# Patient Record
Sex: MALE | Race: WHITE | NOT HISPANIC OR LATINO | Employment: PART TIME | URBAN - NONMETROPOLITAN AREA
[De-identification: names, ages, dates, MRNs, and addresses within clinical notes are randomized per-mention and may not be internally consistent; named-entity substitution may affect disease eponyms.]

---

## 2022-02-16 ENCOUNTER — ALLIED HEALTH/NURSE VISIT (OUTPATIENT)
Dept: FAMILY MEDICINE | Facility: OTHER | Age: 32
End: 2022-02-16
Attending: FAMILY MEDICINE
Payer: OTHER GOVERNMENT

## 2022-02-16 DIAGNOSIS — Z20.822 COVID-19 RULED OUT: Primary | ICD-10-CM

## 2022-02-16 PROCEDURE — C9803 HOPD COVID-19 SPEC COLLECT: HCPCS

## 2022-02-16 PROCEDURE — U0005 INFEC AGEN DETEC AMPLI PROBE: HCPCS | Mod: ZL

## 2022-02-17 LAB — SARS-COV-2 RNA RESP QL NAA+PROBE: NEGATIVE

## 2022-02-22 ENCOUNTER — ALLIED HEALTH/NURSE VISIT (OUTPATIENT)
Dept: FAMILY MEDICINE | Facility: OTHER | Age: 32
End: 2022-02-22
Attending: FAMILY MEDICINE
Payer: OTHER GOVERNMENT

## 2022-02-22 DIAGNOSIS — Z20.822 COVID-19 RULED OUT: Primary | ICD-10-CM

## 2022-02-22 PROCEDURE — U0003 INFECTIOUS AGENT DETECTION BY NUCLEIC ACID (DNA OR RNA); SEVERE ACUTE RESPIRATORY SYNDROME CORONAVIRUS 2 (SARS-COV-2) (CORONAVIRUS DISEASE [COVID-19]), AMPLIFIED PROBE TECHNIQUE, MAKING USE OF HIGH THROUGHPUT TECHNOLOGIES AS DESCRIBED BY CMS-2020-01-R: HCPCS | Mod: ZL

## 2022-02-22 PROCEDURE — C9803 HOPD COVID-19 SPEC COLLECT: HCPCS

## 2022-02-23 LAB — SARS-COV-2 RNA RESP QL NAA+PROBE: NEGATIVE

## 2022-03-26 ENCOUNTER — HEALTH MAINTENANCE LETTER (OUTPATIENT)
Age: 32
End: 2022-03-26

## 2022-09-06 ENCOUNTER — OFFICE VISIT (OUTPATIENT)
Dept: FAMILY MEDICINE | Facility: OTHER | Age: 32
End: 2022-09-06
Attending: FAMILY MEDICINE
Payer: COMMERCIAL

## 2022-09-06 ENCOUNTER — ALLIED HEALTH/NURSE VISIT (OUTPATIENT)
Dept: FAMILY MEDICINE | Facility: OTHER | Age: 32
End: 2022-09-06
Payer: COMMERCIAL

## 2022-09-06 VITALS
RESPIRATION RATE: 20 BRPM | OXYGEN SATURATION: 98 % | BODY MASS INDEX: 30.34 KG/M2 | HEART RATE: 72 BPM | TEMPERATURE: 97.4 F | HEIGHT: 68 IN | SYSTOLIC BLOOD PRESSURE: 132 MMHG | DIASTOLIC BLOOD PRESSURE: 80 MMHG | WEIGHT: 200.2 LBS

## 2022-09-06 DIAGNOSIS — Z53.9 ERRONEOUS ENCOUNTER--DISREGARD: Primary | ICD-10-CM

## 2022-09-06 DIAGNOSIS — Z11.1 SCREENING EXAMINATION FOR PULMONARY TUBERCULOSIS: Primary | ICD-10-CM

## 2022-09-06 PROCEDURE — G0463 HOSPITAL OUTPT CLINIC VISIT: HCPCS | Performed by: FAMILY MEDICINE

## 2022-09-06 PROCEDURE — 86481 TB AG RESPONSE T-CELL SUSP: CPT | Mod: ZL | Performed by: FAMILY MEDICINE

## 2022-09-06 PROCEDURE — 99202 OFFICE O/P NEW SF 15 MIN: CPT | Performed by: FAMILY MEDICINE

## 2022-09-06 PROCEDURE — 36415 COLL VENOUS BLD VENIPUNCTURE: CPT | Mod: ZL | Performed by: FAMILY MEDICINE

## 2022-09-06 RX ORDER — LORATADINE 10 MG/1
10 TABLET ORAL DAILY PRN
COMMUNITY
End: 2022-09-06

## 2022-09-06 ASSESSMENT — PAIN SCALES - GENERAL: PAINLEVEL: NO PAIN (0)

## 2022-09-06 NOTE — NURSING NOTE
Patient here for TBGold Test for school. Medication Reconciliation: complete.    Farrah Villagomez LPN  9/6/2022 3:19 PM

## 2022-09-06 NOTE — PROGRESS NOTES
"  Assessment & Plan       ICD-10-CM    1. Screening examination for pulmonary tuberculosis  Z11.1 Quantiferon-TB Gold Plus     Quantiferon-TB Gold Plus     He is in a respiratory therapy training program that requires a TB quantiferon and not the Mantoux.  Additionally, he has had a past indeterminant reading on the Mantoux.  No clear exposures to tuberculosis.  No current symptoms.  Ordered quantiferon with results to [facility] for [facility]    Ace Abbott MD   [facility]     [facility]   Demarcus is a 32 year old, presenting for the following health issues:  Blood Draw (TB Gold test for [facility] [place] )      HPI     As above      Review of Systems   General: Denies general constitutional problems  Cardiovascular: Denies problems  Respiratory: Denies problems       Objective    /80   Pulse 72   Temp 97.4  F (36.3  C)   Resp 20   Ht 1.721 m (5' 7.75\")   Wt 90.8 kg (200 lb 3.2 oz)   SpO2 98%   BMI 30.67 kg/m    Body mass index is 30.67 kg/m .  Physical Exam   General Appearance: Alert. No acute distress  Chest/Respiratory Exam: Clear to auscultation bilaterally  Psychiatric: Normal affect and mentation        "

## 2022-09-08 LAB
GAMMA INTERFERON BACKGROUND BLD IA-ACNC: 0.04 IU/ML
M TB IFN-G BLD-IMP: NEGATIVE
M TB IFN-G CD4+ BCKGRND COR BLD-ACNC: 9.96 IU/ML
MITOGEN IGNF BCKGRD COR BLD-ACNC: -0.01 IU/ML
MITOGEN IGNF BCKGRD COR BLD-ACNC: -0.01 IU/ML
QUANTIFERON MITOGEN: 10 IU/ML
QUANTIFERON NIL TUBE: 0.04 IU/ML
QUANTIFERON TB1 TUBE: 0.03 IU/ML
QUANTIFERON TB2 TUBE: 0.03

## 2022-09-17 ENCOUNTER — HEALTH MAINTENANCE LETTER (OUTPATIENT)
Age: 32
End: 2022-09-17

## 2023-11-30 ENCOUNTER — HOSPITAL ENCOUNTER (EMERGENCY)
Facility: OTHER | Age: 33
Discharge: HOME | End: 2023-11-30
Attending: EMERGENCY MEDICINE | Admitting: EMERGENCY MEDICINE
Payer: COMMERCIAL

## 2023-11-30 VITALS
HEART RATE: 133 BPM | WEIGHT: 190 LBS | RESPIRATION RATE: 16 BRPM | DIASTOLIC BLOOD PRESSURE: 103 MMHG | BODY MASS INDEX: 28.79 KG/M2 | SYSTOLIC BLOOD PRESSURE: 162 MMHG | HEIGHT: 68 IN | TEMPERATURE: 98.2 F | OXYGEN SATURATION: 99 %

## 2023-11-30 DIAGNOSIS — F10.920 ALCOHOLIC INTOXICATION WITHOUT COMPLICATION (H): ICD-10-CM

## 2023-11-30 PROCEDURE — 99282 EMERGENCY DEPT VISIT SF MDM: CPT | Performed by: EMERGENCY MEDICINE

## 2023-11-30 PROCEDURE — 99282 EMERGENCY DEPT VISIT SF MDM: CPT

## 2023-11-30 ASSESSMENT — ENCOUNTER SYMPTOMS
VOMITING: 0
CHEST TIGHTNESS: 0
LIGHT-HEADEDNESS: 0
ARTHRALGIAS: 0
DYSURIA: 0
NAUSEA: 0
FEVER: 0
CHILLS: 0
SHORTNESS OF BREATH: 0
AGITATION: 1

## 2023-11-30 NOTE — ED PROVIDER NOTES
History     Chief Complaint   Patient presents with    Mental Health Eval     ZULEYMA  Demarcus Schilling is a 33 year old male who is brought in by law enforcement.  Apparently the patient had called 911.  He said that he was just having a bad night when I asked him how he was doing.  He would not really get any more specific than that.  Please said that he just was not making sense.  They did not report that he made any suicidal statements.  He is denying suicidality at this time.  Since he has been here and none of the staff have heard him say anything suggesting he is suicidal.  Patient's mother arrives here in the emergency department and is comfortable taking him home.  She does not see anything unusual has not had any indications recently to make her concern for his safety either.    Allergies:  Allergies   Allergen Reactions    Seasonal Allergies Other (See Comments)       Problem List:    Patient Active Problem List    Diagnosis Date Noted    Anxiety state 04/19/2013     Priority: Medium    Depression 04/19/2013     Priority: Medium        Past Medical History:    History reviewed. No pertinent past medical history.    Past Surgical History:    History reviewed. No pertinent surgical history.    Family History:    History reviewed. No pertinent family history.    Social History:  Marital Status:  Single [1]  Social History     Tobacco Use    Smoking status: Never    Smokeless tobacco: Current   Vaping Use    Vaping Use: Never used   Substance Use Topics    Alcohol use: Yes     Comment: 2 a week    Drug use: Never        Medications:    No current outpatient medications on file.        Review of Systems   Constitutional:  Negative for chills and fever.   HENT:  Negative for congestion.    Eyes:  Negative for visual disturbance.   Respiratory:  Negative for chest tightness and shortness of breath.    Cardiovascular:  Negative for chest pain.   Gastrointestinal:  Negative for nausea and vomiting.   Genitourinary:   "Negative for dysuria.   Musculoskeletal:  Negative for arthralgias.   Skin:  Negative for rash.   Neurological:  Negative for light-headedness.   Psychiatric/Behavioral:  Positive for agitation. Negative for suicidal ideas.        Physical Exam   BP: (!) 162/103  Pulse: (!) 133  Temp: 98.2  F (36.8  C)  Resp: 16  Height: 172.7 cm (5' 8\")  Weight: 86.2 kg (190 lb)  SpO2: 99 %      Physical Exam  Vitals and nursing note reviewed.   Constitutional:       Appearance: Normal appearance.   HENT:      Head: Normocephalic and atraumatic.      Mouth/Throat:      Mouth: Mucous membranes are moist.   Eyes:      Conjunctiva/sclera: Conjunctivae normal.   Cardiovascular:      Rate and Rhythm: Normal rate.   Pulmonary:      Effort: Pulmonary effort is normal.   Skin:     General: Skin is warm and dry.   Neurological:      General: No focal deficit present.      Mental Status: He is alert.   Psychiatric:      Comments: He is obviously somewhat intoxicated, however he is steady on his feet and able to ambulate without difficulty.  He answers questions well.         ED Course                 Procedures                  No results found for this or any previous visit (from the past 24 hour(s)).    Medications - No data to display    Assessments & Plan (with Medical Decision Making)     I have reviewed the nursing notes.    I have reviewed the findings, diagnosis, plan and need for follow up with the patient.  Patient with alcohol intoxication.  Denying suicidality.  His mother is here says he is not acting out of character and she is willing to take him home.  We did discuss detox and although he seems to enter [place] the possibilities for a little while, he ultimately declined.  He will be discharged home with his mother.      New Prescriptions    No medications on file       Final diagnoses:   Alcoholic intoxication without complication (H24)       11/30/2023   [facility] AND \[facility]\""       Toby Awan MD  11/30/23 " 3440

## 2023-11-30 NOTE — ED TRIAGE NOTES
Pt arrives to the ED via police. They state that he called the 911 dispatch and wasn't making sense over the phone. Pt states he called dispatch by accident and they said they would send someone out to check on him. He is unhappy they brought him here. Would like to go home.     Triage Assessment (Adult)       Row Name 11/30/23 0328          Triage Assessment    Airway WDL WDL        Respiratory WDL    Respiratory WDL WDL        Skin Circulation/Temperature WDL    Skin Circulation/Temperature WDL WDL        Cardiac WDL    Cardiac WDL WDL        Peripheral/Neurovascular WDL    Peripheral Neurovascular WDL WDL        Cognitive/Neuro/Behavioral WDL    Cognitive/Neuro/Behavioral WDL WDL

## 2024-04-30 ENCOUNTER — HOSPITAL ENCOUNTER (EMERGENCY)
Facility: OTHER | Age: 34
Discharge: HOME | End: 2024-04-30
Attending: FAMILY MEDICINE | Admitting: FAMILY MEDICINE
Payer: COMMERCIAL

## 2024-04-30 VITALS
TEMPERATURE: 98.2 F | SYSTOLIC BLOOD PRESSURE: 142 MMHG | HEART RATE: 125 BPM | OXYGEN SATURATION: 97 % | WEIGHT: 190 LBS | RESPIRATION RATE: 20 BRPM | BODY MASS INDEX: 28.79 KG/M2 | DIASTOLIC BLOOD PRESSURE: 87 MMHG | HEIGHT: 68 IN

## 2024-04-30 DIAGNOSIS — F10.220 ALCOHOL DEPENDENCE WITH UNCOMPLICATED INTOXICATION (H): ICD-10-CM

## 2024-04-30 DIAGNOSIS — F10.920 ALCOHOLIC INTOXICATION WITHOUT COMPLICATION (H): ICD-10-CM

## 2024-04-30 LAB
ALBUMIN SERPL BCG-MCNC: 5.1 G/DL (ref 3.5–5.2)
ALP SERPL-CCNC: 92 U/L (ref 40–150)
ALT SERPL W P-5'-P-CCNC: 50 U/L (ref 0–70)
AMMONIA PLAS-SCNC: 26 UMOL/L (ref 16–60)
ANION GAP SERPL CALCULATED.3IONS-SCNC: 16 MMOL/L (ref 7–15)
AST SERPL W P-5'-P-CCNC: 38 U/L (ref 0–45)
BASOPHILS # BLD AUTO: 0 10E3/UL (ref 0–0.2)
BASOPHILS NFR BLD AUTO: 1 %
BILIRUB SERPL-MCNC: 0.2 MG/DL
BUN SERPL-MCNC: 8.2 MG/DL (ref 6–20)
CALCIUM SERPL-MCNC: 9.4 MG/DL (ref 8.6–10)
CHLORIDE SERPL-SCNC: 103 MMOL/L (ref 98–107)
CREAT SERPL-MCNC: 0.73 MG/DL (ref 0.67–1.17)
DEPRECATED HCO3 PLAS-SCNC: 21 MMOL/L (ref 22–29)
EGFRCR SERPLBLD CKD-EPI 2021: >90 ML/MIN/1.73M2
EOSINOPHIL # BLD AUTO: 0.1 10E3/UL (ref 0–0.7)
EOSINOPHIL NFR BLD AUTO: 2 %
ERYTHROCYTE [DISTWIDTH] IN BLOOD BY AUTOMATED COUNT: 11.9 % (ref 10–15)
ETHANOL SERPL-MCNC: 0.31 G/DL
ETHANOL SERPL-MCNC: 0.37 G/DL
GLUCOSE SERPL-MCNC: 132 MG/DL (ref 70–99)
HCT VFR BLD AUTO: 48.6 % (ref 40–53)
HGB BLD-MCNC: 17.3 G/DL (ref 13.3–17.7)
HOLD SPECIMEN: NORMAL
IMM GRANULOCYTES # BLD: 0 10E3/UL
IMM GRANULOCYTES NFR BLD: 1 %
LYMPHOCYTES # BLD AUTO: 1.8 10E3/UL (ref 0.8–5.3)
LYMPHOCYTES NFR BLD AUTO: 40 %
MAGNESIUM SERPL-MCNC: 1.8 MG/DL (ref 1.7–2.3)
MCH RBC QN AUTO: 32.2 PG (ref 26.5–33)
MCHC RBC AUTO-ENTMCNC: 35.6 G/DL (ref 31.5–36.5)
MCV RBC AUTO: 91 FL (ref 78–100)
MONOCYTES # BLD AUTO: 0.6 10E3/UL (ref 0–1.3)
MONOCYTES NFR BLD AUTO: 13 %
NEUTROPHILS # BLD AUTO: 1.9 10E3/UL (ref 1.6–8.3)
NEUTROPHILS NFR BLD AUTO: 44 %
NRBC # BLD AUTO: 0 10E3/UL
NRBC BLD AUTO-RTO: 0 /100
PHOSPHATE SERPL-MCNC: 3.6 MG/DL (ref 2.5–4.5)
PLATELET # BLD AUTO: 200 10E3/UL (ref 150–450)
POTASSIUM SERPL-SCNC: 4.2 MMOL/L (ref 3.4–5.3)
PROT SERPL-MCNC: 8.4 G/DL (ref 6.4–8.3)
RBC # BLD AUTO: 5.37 10E6/UL (ref 4.4–5.9)
SODIUM SERPL-SCNC: 140 MMOL/L (ref 135–145)
WBC # BLD AUTO: 4.4 10E3/UL (ref 4–11)

## 2024-04-30 PROCEDURE — 82077 ASSAY SPEC XCP UR&BREATH IA: CPT | Performed by: FAMILY MEDICINE

## 2024-04-30 PROCEDURE — 36415 COLL VENOUS BLD VENIPUNCTURE: CPT | Performed by: FAMILY MEDICINE

## 2024-04-30 PROCEDURE — 80053 COMPREHEN METABOLIC PANEL: CPT | Performed by: FAMILY MEDICINE

## 2024-04-30 PROCEDURE — 82140 ASSAY OF AMMONIA: CPT | Performed by: FAMILY MEDICINE

## 2024-04-30 PROCEDURE — 99283 EMERGENCY DEPT VISIT LOW MDM: CPT

## 2024-04-30 PROCEDURE — 83735 ASSAY OF MAGNESIUM: CPT | Performed by: FAMILY MEDICINE

## 2024-04-30 PROCEDURE — 84100 ASSAY OF PHOSPHORUS: CPT | Performed by: FAMILY MEDICINE

## 2024-04-30 PROCEDURE — 99284 EMERGENCY DEPT VISIT MOD MDM: CPT | Performed by: FAMILY MEDICINE

## 2024-04-30 PROCEDURE — 85004 AUTOMATED DIFF WBC COUNT: CPT | Performed by: FAMILY MEDICINE

## 2024-04-30 ASSESSMENT — ACTIVITIES OF DAILY LIVING (ADL)
ADLS_ACUITY_SCORE: 35
ADLS_ACUITY_SCORE: 35
ADLS_ACUITY_SCORE: 33
ADLS_ACUITY_SCORE: 35
ADLS_ACUITY_SCORE: 35
ADLS_ACUITY_SCORE: 33

## 2024-04-30 ASSESSMENT — COLUMBIA-SUICIDE SEVERITY RATING SCALE - C-SSRS
6. HAVE YOU EVER DONE ANYTHING, STARTED TO DO ANYTHING, OR PREPARED TO DO ANYTHING TO END YOUR LIFE?: NO
2. HAVE YOU ACTUALLY HAD ANY THOUGHTS OF KILLING YOURSELF IN THE PAST MONTH?: NO
1. IN THE PAST MONTH, HAVE YOU WISHED YOU WERE DEAD OR WISHED YOU COULD GO TO SLEEP AND NOT WAKE UP?: NO

## 2024-04-30 NOTE — ED TRIAGE NOTES
"ED Nursing Triage Note (General)   ________________________________    Demarcus Schilling is a 33 year old Male that presents to triage via private vehicle with complaints of alcohol intoxication.  Patient states his family brought him to the ER stating, \"they think I need detox\".  When asked to further exaggerate on why the family thinks patient needs detox patient states, \"I'm drunk\".  Staff again asked patient to exaggerate on this and patient states, \"its hard for me to be honest with you when I can hear everyone talking out there.  Is this going to be in my chart forever?\"  Staff asked patient how much he drinks per day and patient states, \"whatever I tell you it'll be times 6.  Do you want me to be honest?\"  Staff educated patient that the best way to help him is if he is completely honest.  Patient then stated to staff, \"about 100 drinks a week.  I've been doing it for a couple years.  This is probably going to ruin any chance of me getting health insurance\".  Drink of choice is vodka per patient.  Patient states he has never gone to detox and states he has not quit drinking long enough to have withdrawal sx.  Last alcohol beverage was at 1500 today. Patient continues to resite to staff, \"I really dont feel like I need to be here\". Staff asked patient at this time if he is willing to get help if it is offered, patient states, \"absolutely\", however, when asked if patient is willing to go to detox patient states, \"no, I'm not sitting here for hours, my family brought me here against my will\".  Patient is willing to come to a room with staff and be evaluated at this time.   Significant symptoms had onset 2 year(s) ago.  Vital signs:  Temp: 98.2  F (36.8  C) Temp src: Tympanic BP: (!) 142/87 Pulse: (!) 125   Resp: 20 SpO2: 97 %     Height: 172.7 cm (5' 8\") Weight: 86.2 kg (190 lb)  Estimated body mass index is 28.89 kg/m  as calculated from the following:    Height as of this encounter: 1.727 m (5' 8\").    Weight " as of this encounter: 86.2 kg (190 lb).       PRE HOSPITAL PRIOR LIVING SITUATION-parents     Triage Assessment (Adult)       Row Name 04/30/24 6933          Triage Assessment    Airway WDL WDL        Respiratory WDL    Respiratory WDL WDL        Skin Circulation/Temperature WDL    Skin Circulation/Temperature WDL WDL        Cardiac WDL    Cardiac WDL X     Cardiac Rhythm ST        Peripheral/Neurovascular WDL    Peripheral Neurovascular WDL WDL        Cognitive/Neuro/Behavioral WDL    Cognitive/Neuro/Behavioral WDL WDL

## 2024-04-30 NOTE — ED PROVIDER NOTES
"  History     Chief Complaint   Patient presents with    Alcohol Intoxication     Here with Mom    The history is provided by the patient and a parent.     Demarcus Schilling is a 33 year old male who does not want to be here. He admits to drinking vodka. His family convinced him to come to the ED. His last drink was 3 PM.     I spoke with his mom. He has been drinking, hiding it from family, and she is not sure how he has the money for that. He is not employed. He did have some savings from a prior job. She does not give him cash.  She really wants him to go to Detox and Treatment.     He has a history of anxiety, depression and alcohol abuse.     Allergies:  Allergies   Allergen Reactions    Seasonal Allergies Other (See Comments)       Problem List:    Patient Active Problem List    Diagnosis Date Noted    Alcohol dependence with uncomplicated intoxication (H) 04/30/2024     Priority: Medium    Anxiety state 04/19/2013     Priority: Medium    Depression 04/19/2013     Priority: Medium        Past Medical History:    History reviewed. No pertinent past medical history.    Past Surgical History:    History reviewed. No pertinent surgical history.    Family History:    History reviewed. No pertinent family history.    Social History:  Marital Status:  Single [1]  Social History     Tobacco Use    Smoking status: Never    Smokeless tobacco: Current   Vaping Use    Vaping status: Never Used   Substance Use Topics    Alcohol use: Yes     Comment: daily    Drug use: Never        Medications:    No current outpatient medications on file.        Review of Systems   Psychiatric/Behavioral:          Alcohol intoxication   All other systems reviewed and are negative.      Physical Exam   BP: (!) 142/87  Pulse: (!) 125  Temp: 98.2  F (36.8  C)  Resp: 20  Height: 172.7 cm (5' 8\")  Weight: 86.2 kg (190 lb)  SpO2: 97 %      Physical Exam  Vitals and nursing note reviewed.   Constitutional:       Comments: intoxicated   Pulmonary: "      Effort: Pulmonary effort is normal. No respiratory distress.   Neurological:      General: No focal deficit present.      Mental Status: He is alert and oriented to person, place, and time.         Results for orders placed or performed during the hospital encounter of 04/30/24 (from the past 24 hour(s))   CBC with platelets differential    Narrative    The following orders were created for panel order CBC with platelets differential.  Procedure                               Abnormality         Status                     ---------                               -----------         ------                     CBC with platelets and d...[[id number]]                      Final result                 Please view results for these tests on the individual orders.   Comprehensive metabolic panel   Result Value Ref Range    Sodium 140 135 - 145 mmol/L    Potassium 4.2 3.4 - 5.3 mmol/L    Carbon Dioxide (CO2) 21 (L) 22 - 29 mmol/L    Anion Gap 16 (H) 7 - 15 mmol/L    Urea Nitrogen 8.2 6.0 - 20.0 mg/dL    Creatinine 0.73 0.67 - 1.17 mg/dL    GFR Estimate >90 >60 mL/min/1.73m2    Calcium 9.4 8.6 - 10.0 mg/dL    Chloride 103 98 - 107 mmol/L    Glucose 132 (H) 70 - 99 mg/dL    Alkaline Phosphatase 92 40 - 150 U/L    AST 38 0 - 45 U/L    ALT 50 0 - 70 U/L    Protein Total 8.4 (H) 6.4 - 8.3 g/dL    Albumin 5.1 3.5 - 5.2 g/dL    Bilirubin Total 0.2 <=1.2 mg/dL   Ethanol GH   Result Value Ref Range    Alcohol ethyl 0.37 (HH) <=0.01 g/dL   Ammonia   Result Value Ref Range    Ammonia 26 16 - 60 umol/L   Magnesium   Result Value Ref Range    Magnesium 1.8 1.7 - 2.3 mg/dL   Phosphorus   Result Value Ref Range    Phosphorus 3.6 2.5 - 4.5 mg/dL   CBC with platelets and differential   Result Value Ref Range    WBC Count 4.4 4.0 - 11.0 10e3/uL    RBC Count 5.37 4.40 - 5.90 10e6/uL    Hemoglobin 17.3 13.3 - 17.7 g/dL    Hematocrit 48.6 40.0 - 53.0 %    MCV 91 78 - 100 fL    MCH 32.2 26.5 - 33.0 pg    MCHC 35.6 31.5 - 36.5 g/dL    RDW 11.9  10.0 - 15.0 %    Platelet Count 200 150 - 450 10e3/uL    % Neutrophils 44 %    % Lymphocytes 40 %    % Monocytes 13 %    % Eosinophils 2 %    % Basophils 1 %    % Immature Granulocytes 1 %    NRBCs per 100 WBC 0 <1 /100    Absolute Neutrophils 1.9 1.6 - 8.3 10e3/uL    Absolute Lymphocytes 1.8 0.8 - 5.3 10e3/uL    Absolute Monocytes 0.6 0.0 - 1.3 10e3/uL    Absolute Eosinophils 0.1 0.0 - 0.7 10e3/uL    Absolute Basophils 0.0 0.0 - 0.2 10e3/uL    Absolute Immature Granulocytes 0.0 <=0.4 10e3/uL    Absolute NRBCs 0.0 10e3/uL   Extra Tube    Narrative    The following orders were created for panel order Extra Tube.  Procedure                               Abnormality         Status                     ---------                               -----------         ------                     Extra Blue Top Tube[[id number]]                              Final result               Extra Red Top Tube[[id number]]                               Final result               Extra Green Top (Lithium...[[id number]]                      Final result                 Please view results for these tests on the individual orders.   Extra Blue Top Tube   Result Value Ref Range    Hold Specimen JIC    Extra Red Top Tube   Result Value Ref Range    Hold Specimen JIC    Extra Green Top (Lithium Heparin) Tube   Result Value Ref Range    Hold Specimen JIC    Ethanol GH   Result Value Ref Range    Alcohol ethyl 0.31 (HH) <=0.01 g/dL       Medications - No data to display    Assessments & Plan (with Medical Decision Making)  Demarcus Schilling is a 33 year old male who does not want to be here. He admits to drinking vodka. His family convinced him to come to the ED. His last drink was 3 PM.   I spoke with his mom. He has been drinking, hiding it from family, and she is not sure how he has the money for that. He is not employed. He did have some savings from a prior job. She does not give him cash.  She really wants him to go to Detox and Treatment.   He  "has a history of anxiety, depression and alcohol abuse.   VS in the ED BP (!) 142/87   Pulse (!) 125   Temp 98.2  F (36.8  C) (Tympanic)   Resp 20   Ht 1.727 m (5' 8\")   Wt 86.2 kg (190 lb)   SpO2 97%   BMI 28.89 kg/m    Exam shows intoxication, no other concerns.   Labs show CBC normal, CMP okay, Mg normal, phos normal, ammonia normal, ethanol 0.37.  6:44 PM  He is on a 72 Hour Hold as he is intoxicated and trying to leave.   10:26 PM   Repeat ethanol 0.31. We called Detox and and they will take him.      I have reviewed the nursing notes.    I have reviewed the findings, diagnosis, plan and need for follow up with the patient.  Medical Decision Making  The patient's presentation was of moderate complexity (a chronic illness mild to moderate exacerbation, progression, or side effect of treatment).    The patient's evaluation involved:  an assessment requiring an independent historian (see separate area of note for details)  ordering and/or review of 3+ test(s) in this encounter (see separate area of note for details)    The patient's management necessitated only low risk treatment.    Final diagnoses:   Alcoholic intoxication without complication (H24)   Alcohol dependence with uncomplicated intoxication (H)       4/30/2024   [facility], Wilbur Rodriguez MD  04/30/24 1134    "

## 2024-05-01 NOTE — ED PROVIDER NOTES
"  History     Chief Complaint   Patient presents with    Alcohol Intoxication     HPI  Demarcus Schilling is a 33 year old male who I assumed care of at shift change.  Awaiting transportation to go to detox.    Allergies:  Allergies   Allergen Reactions    Seasonal Allergies Other (See Comments)       Problem List:    Patient Active Problem List    Diagnosis Date Noted    Alcohol dependence with uncomplicated intoxication (H) 04/30/2024     Priority: Medium    Anxiety state 04/19/2013     Priority: Medium    Depression 04/19/2013     Priority: Medium        Past Medical History:    History reviewed. No pertinent past medical history.    Past Surgical History:    History reviewed. No pertinent surgical history.    Family History:    History reviewed. No pertinent family history.    Social History:  Marital Status:  Single [1]  Social History     Tobacco Use    Smoking status: Never    Smokeless tobacco: Current   Vaping Use    Vaping status: Never Used   Substance Use Topics    Alcohol use: Yes     Comment: daily    Drug use: Never        Medications:    No current outpatient medications on file.        Review of Systems    Physical Exam   BP: (!) 142/87  Pulse: (!) 125  Temp: 98.2  F (36.8  C)  Resp: 20  Height: 172.7 cm (5' 8\")  Weight: 86.2 kg (190 lb)  SpO2: 97 %      Physical Exam    ED Course     ED Course as of 04/30/24 2315   Tue Apr 30, 2024   2313 Sign out at shift change. Awaiting bed for detox.     Procedures              Critical Care time:  none               Results for orders placed or performed during the hospital encounter of 04/30/24 (from the past 24 hour(s))   CBC with platelets differential    Narrative    The following orders were created for panel order CBC with platelets differential.  Procedure                               Abnormality         Status                     ---------                               -----------         ------                     CBC with platelets and d...[[id number]] "                      Final result                 Please view results for these tests on the individual orders.   Comprehensive metabolic panel   Result Value Ref Range    Sodium 140 135 - 145 mmol/L    Potassium 4.2 3.4 - 5.3 mmol/L    Carbon Dioxide (CO2) 21 (L) 22 - 29 mmol/L    Anion Gap 16 (H) 7 - 15 mmol/L    Urea Nitrogen 8.2 6.0 - 20.0 mg/dL    Creatinine 0.73 0.67 - 1.17 mg/dL    GFR Estimate >90 >60 mL/min/1.73m2    Calcium 9.4 8.6 - 10.0 mg/dL    Chloride 103 98 - 107 mmol/L    Glucose 132 (H) 70 - 99 mg/dL    Alkaline Phosphatase 92 40 - 150 U/L    AST 38 0 - 45 U/L    ALT 50 0 - 70 U/L    Protein Total 8.4 (H) 6.4 - 8.3 g/dL    Albumin 5.1 3.5 - 5.2 g/dL    Bilirubin Total 0.2 <=1.2 mg/dL   Ethanol GH   Result Value Ref Range    Alcohol ethyl 0.37 (HH) <=0.01 g/dL   Ammonia   Result Value Ref Range    Ammonia 26 16 - 60 umol/L   Magnesium   Result Value Ref Range    Magnesium 1.8 1.7 - 2.3 mg/dL   Phosphorus   Result Value Ref Range    Phosphorus 3.6 2.5 - 4.5 mg/dL   CBC with platelets and differential   Result Value Ref Range    WBC Count 4.4 4.0 - 11.0 10e3/uL    RBC Count 5.37 4.40 - 5.90 10e6/uL    Hemoglobin 17.3 13.3 - 17.7 g/dL    Hematocrit 48.6 40.0 - 53.0 %    MCV 91 78 - 100 fL    MCH 32.2 26.5 - 33.0 pg    MCHC 35.6 31.5 - 36.5 g/dL    RDW 11.9 10.0 - 15.0 %    Platelet Count 200 150 - 450 10e3/uL    % Neutrophils 44 %    % Lymphocytes 40 %    % Monocytes 13 %    % Eosinophils 2 %    % Basophils 1 %    % Immature Granulocytes 1 %    NRBCs per 100 WBC 0 <1 /100    Absolute Neutrophils 1.9 1.6 - 8.3 10e3/uL    Absolute Lymphocytes 1.8 0.8 - 5.3 10e3/uL    Absolute Monocytes 0.6 0.0 - 1.3 10e3/uL    Absolute Eosinophils 0.1 0.0 - 0.7 10e3/uL    Absolute Basophils 0.0 0.0 - 0.2 10e3/uL    Absolute Immature Granulocytes 0.0 <=0.4 10e3/uL    Absolute NRBCs 0.0 10e3/uL   Extra Tube    Narrative    The following orders were created for panel order Extra Tube.  Procedure                                Abnormality         Status                     ---------                               -----------         ------                     Extra Blue Top Tube[[id number]]                              Final result               Extra Red Top Tube[[id number]]                               Final result               Extra Green Top (Lithium...[[id number]]                      Final result                 Please view results for these tests on the individual orders.   Extra Blue Top Tube   Result Value Ref Range    Hold Specimen JIC    Extra Red Top Tube   Result Value Ref Range    Hold Specimen JIC    Extra Green Top (Lithium Heparin) Tube   Result Value Ref Range    Hold Specimen JIC    Ethanol GH   Result Value Ref Range    Alcohol ethyl 0.31 (HH) <=0.01 g/dL       Medications - No data to display    Assessments & Plan (with Medical Decision Making)     I have reviewed the nursing notes.    I have reviewed the findings, diagnosis, plan and need for follow up with the patient.           Medical Decision Making  The patient's presentation was of high complexity (a chronic illness severe exacerbation, progression, or side effect of treatment).    The patient's evaluation involved:  review of 3+ test result(s) ordered prior to this encounter (see separate area of note for details)    The patient's management necessitated high risk (a decision regarding hospitalization).        There are no discharge medications for this patient.      Final diagnoses:   Alcoholic intoxication without complication (H24)   Alcohol dependence with uncomplicated intoxication (H)   Patient transferred to detox    4/30/2024   [facility] AND [facility]       Mercedes Rodriguez DO  05/01/24 0209

## 2024-05-01 NOTE — ED NOTES
Detox called at this time to determine if male bed is available.  Per Carmine at detox, on call nurse will return call for report.

## 2024-05-01 NOTE — PROGRESS NOTES
Current etoh 3.1.  Ivonne at [facility] detox agreeable for pt to go to detox.

## 2024-05-01 NOTE — PROGRESS NOTES
Pt refuses vitals monitoring  Priscilla Haywood RN.............................4/30/2024 10:14 PM

## 2025-05-17 ENCOUNTER — HEALTH MAINTENANCE LETTER (OUTPATIENT)
Age: 35
End: 2025-05-17